# Patient Record
Sex: FEMALE | Race: WHITE | NOT HISPANIC OR LATINO | URBAN - METROPOLITAN AREA
[De-identification: names, ages, dates, MRNs, and addresses within clinical notes are randomized per-mention and may not be internally consistent; named-entity substitution may affect disease eponyms.]

---

## 2022-06-16 ENCOUNTER — OUT OF OFFICE VISIT (OUTPATIENT)
Dept: URBAN - METROPOLITAN AREA MEDICAL CENTER 34 | Facility: MEDICAL CENTER | Age: 57
End: 2022-06-16
Payer: SELF-PAY

## 2022-06-16 DIAGNOSIS — K76.9 CHRONIC LIVER DISEASE: ICD-10-CM

## 2022-06-16 DIAGNOSIS — D64.89 ANEMIA DUE TO OTHER CAUSE: ICD-10-CM

## 2022-06-16 DIAGNOSIS — K76.6 CLINICALLY SIGNIFICANT PORTAL HYPERTENSION: ICD-10-CM

## 2022-06-16 PROCEDURE — 99254 IP/OBS CNSLTJ NEW/EST MOD 60: CPT | Performed by: INTERNAL MEDICINE

## 2022-06-16 PROCEDURE — 99232 SBSQ HOSP IP/OBS MODERATE 35: CPT | Performed by: INTERNAL MEDICINE

## 2022-06-18 ENCOUNTER — OUT OF OFFICE VISIT (OUTPATIENT)
Dept: URBAN - METROPOLITAN AREA MEDICAL CENTER 34 | Facility: MEDICAL CENTER | Age: 57
End: 2022-06-18
Payer: SELF-PAY

## 2022-06-18 DIAGNOSIS — D68.8 AFIBRINOGENEMIA: ICD-10-CM

## 2022-06-18 DIAGNOSIS — D50.8 ACQUIRED IRON DEFICIENCY ANEMIA DUE TO DECREASED ABSORPTION: ICD-10-CM

## 2022-06-18 DIAGNOSIS — K74.69 CIRRHOSIS, CRYPTOGENIC: ICD-10-CM

## 2022-06-18 DIAGNOSIS — R18.8 ABDOMINAL ASCITES: ICD-10-CM

## 2022-06-18 PROCEDURE — 99232 SBSQ HOSP IP/OBS MODERATE 35: CPT | Performed by: INTERNAL MEDICINE

## 2022-06-18 PROCEDURE — 99233 SBSQ HOSP IP/OBS HIGH 50: CPT | Performed by: INTERNAL MEDICINE

## 2022-06-20 ENCOUNTER — OUT OF OFFICE VISIT (OUTPATIENT)
Dept: URBAN - METROPOLITAN AREA MEDICAL CENTER 34 | Facility: MEDICAL CENTER | Age: 57
End: 2022-06-20
Payer: SELF-PAY

## 2022-06-20 DIAGNOSIS — K74.69 CIRRHOSIS, CRYPTOGENIC: ICD-10-CM

## 2022-06-20 DIAGNOSIS — D68.8 AFIBRINOGENEMIA: ICD-10-CM

## 2022-06-20 DIAGNOSIS — D50.8 ACQUIRED IRON DEFICIENCY ANEMIA DUE TO DECREASED ABSORPTION: ICD-10-CM

## 2022-06-20 DIAGNOSIS — R18.8 ABDOMINAL ASCITES: ICD-10-CM

## 2022-06-20 PROCEDURE — 99232 SBSQ HOSP IP/OBS MODERATE 35: CPT | Performed by: INTERNAL MEDICINE

## 2022-07-05 ENCOUNTER — TELEPHONE ENCOUNTER (OUTPATIENT)
Dept: URBAN - METROPOLITAN AREA CLINIC 94 | Facility: CLINIC | Age: 57
End: 2022-07-05

## 2022-07-10 PROBLEM — 328383001 CHRONIC LIVER DISEASE: Status: ACTIVE | Noted: 2022-07-10

## 2022-07-20 ENCOUNTER — WEB ENCOUNTER (OUTPATIENT)
Dept: URBAN - METROPOLITAN AREA CLINIC 94 | Facility: CLINIC | Age: 57
End: 2022-07-20

## 2022-07-20 ENCOUNTER — LAB OUTSIDE AN ENCOUNTER (OUTPATIENT)
Dept: URBAN - METROPOLITAN AREA CLINIC 94 | Facility: CLINIC | Age: 57
End: 2022-07-20

## 2022-07-20 ENCOUNTER — OFFICE VISIT (OUTPATIENT)
Dept: URBAN - METROPOLITAN AREA CLINIC 94 | Facility: CLINIC | Age: 57
End: 2022-07-20
Payer: SELF-PAY

## 2022-07-20 VITALS
WEIGHT: 138 LBS | SYSTOLIC BLOOD PRESSURE: 136 MMHG | DIASTOLIC BLOOD PRESSURE: 55 MMHG | HEIGHT: 62 IN | BODY MASS INDEX: 25.4 KG/M2 | TEMPERATURE: 98.1 F | HEART RATE: 119 BPM

## 2022-07-20 DIAGNOSIS — D50.8 ACQUIRED IRON DEFICIENCY ANEMIA DUE TO DECREASED ABSORPTION: ICD-10-CM

## 2022-07-20 DIAGNOSIS — K70.31 ALCOHOLIC CIRRHOSIS OF LIVER WITH ASCITES: ICD-10-CM

## 2022-07-20 DIAGNOSIS — K72.90 HEPATIC ENCEPHALOPATHY: ICD-10-CM

## 2022-07-20 PROCEDURE — 99214 OFFICE O/P EST MOD 30 MIN: CPT | Performed by: INTERNAL MEDICINE

## 2022-07-20 RX ORDER — FUROSEMIDE 40 MG/1
1 TABLET TABLET ORAL ONCE A DAY
Status: ACTIVE | COMMUNITY

## 2022-07-20 RX ORDER — PANTOPRAZOLE SODIUM 40 MG/1
1 TABLET TABLET, DELAYED RELEASE ORAL ONCE A DAY
Status: ACTIVE | COMMUNITY

## 2022-07-20 RX ORDER — FUROSEMIDE 40 MG/1
1 TABLET TABLET ORAL ONCE A DAY
Qty: 30 | Refills: 3

## 2022-07-20 RX ORDER — RIFAXIMIN 550 MG/1
1 TABLET TABLET ORAL TWICE A DAY
Status: ACTIVE | COMMUNITY

## 2022-07-20 RX ORDER — PREDNISONE 10 MG/1
1 TABLET TABLET ORAL ONCE A DAY
Status: ACTIVE | COMMUNITY

## 2022-07-20 NOTE — HPI-TODAY'S VISIT:
55 y/o F with alcoholic cirrhosis c/b ascites/HE, GERARDO here for f/u after hospitalization  Patient admitted june for severe GERARDO with Hgb 3.9, iron sat 11, Ferritin 10. Also with elevated LFT's with T.bili ~7 and noted to have cirrhosis on imaging. Workup with negative acute viral hepatitis, MAXINE, ASMA, AMA, ceruloplasmin.  She was treated with diuretics, rifaximin(lactulose caused hypokalemia). Could not get EGD given INR, K. Discharge Hgb 8, PLT 76, INR 2.5, T.bili 6 with rest borderline. Discharge medication lasix 40mg  Today reports doing well with no confusion. Has LE on exam today but reports did not take lasix today, and normally is dry   Ultrasound abd 06/2022: liver steatosis, small ascites, right pleural effusion CT a/p 06/2022: anasarca, chronic liver disease and portal HTN

## 2022-08-12 ENCOUNTER — OFFICE VISIT (OUTPATIENT)
Dept: URBAN - METROPOLITAN AREA CLINIC 94 | Facility: CLINIC | Age: 57
End: 2022-08-12

## 2022-08-31 ENCOUNTER — TELEPHONE ENCOUNTER (OUTPATIENT)
Dept: URBAN - METROPOLITAN AREA CLINIC 94 | Facility: CLINIC | Age: 57
End: 2022-08-31

## 2022-08-31 ENCOUNTER — OFFICE VISIT (OUTPATIENT)
Dept: URBAN - METROPOLITAN AREA CLINIC 94 | Facility: CLINIC | Age: 57
End: 2022-08-31

## 2022-08-31 ENCOUNTER — WEB ENCOUNTER (OUTPATIENT)
Dept: URBAN - METROPOLITAN AREA CLINIC 94 | Facility: CLINIC | Age: 57
End: 2022-08-31

## 2022-08-31 VITALS
DIASTOLIC BLOOD PRESSURE: 67 MMHG | TEMPERATURE: 98.4 F | HEART RATE: 93 BPM | WEIGHT: 148 LBS | HEIGHT: 62 IN | SYSTOLIC BLOOD PRESSURE: 124 MMHG | BODY MASS INDEX: 27.23 KG/M2

## 2022-08-31 RX ORDER — PREDNISONE 10 MG/1
1 TABLET TABLET ORAL ONCE A DAY
Status: ACTIVE | COMMUNITY

## 2022-08-31 RX ORDER — PANTOPRAZOLE SODIUM 40 MG/1
1 TABLET TABLET, DELAYED RELEASE ORAL ONCE A DAY
Status: ACTIVE | COMMUNITY

## 2022-08-31 RX ORDER — RIFAXIMIN 550 MG/1
1 TABLET TABLET ORAL TWICE A DAY
Status: ACTIVE | COMMUNITY

## 2022-08-31 RX ORDER — FUROSEMIDE 40 MG/1
1 TABLET TABLET ORAL ONCE A DAY
Qty: 30 | Refills: 3 | Status: ACTIVE | COMMUNITY

## 2022-09-01 ENCOUNTER — OUT OF OFFICE VISIT (OUTPATIENT)
Dept: URBAN - METROPOLITAN AREA MEDICAL CENTER 34 | Facility: MEDICAL CENTER | Age: 57
End: 2022-09-01
Payer: SELF-PAY

## 2022-09-01 ENCOUNTER — TELEPHONE ENCOUNTER (OUTPATIENT)
Dept: URBAN - METROPOLITAN AREA CLINIC 94 | Facility: CLINIC | Age: 57
End: 2022-09-01

## 2022-09-01 DIAGNOSIS — D50.8 ACQUIRED IRON DEFICIENCY ANEMIA DUE TO DECREASED ABSORPTION: ICD-10-CM

## 2022-09-01 DIAGNOSIS — K70.30 ALC (ALCOHOLIC LIVER CIRRHOSIS): ICD-10-CM

## 2022-09-01 LAB
A/G RATIO: 1.1
ABSOLUTE BASOPHILS: 9
ABSOLUTE EOSINOPHILS: 99
ABSOLUTE LYMPHOCYTES: 810
ABSOLUTE MONOCYTES: 237
ABSOLUTE NEUTROPHILS: 1845
ALBUMIN: 2.9
ALKALINE PHOSPHATASE: 131
ALT (SGPT): 10
AST (SGOT): 39
BASOPHILS: 0.3
BILIRUBIN, TOTAL: 5.5
BUN/CREATININE RATIO: (no result)
BUN: 11
CALCIUM: 8.2
CARBON DIOXIDE, TOTAL: 27
CHLORIDE: 106
CREATININE: 0.53
EGFR: 108
EOSINOPHILS: 3.3
GLOBULIN, TOTAL: 2.6
GLUCOSE: 105
HEMATOCRIT: 17.4
HEMOGLOBIN: 6
INR: 1.3
LYMPHOCYTES: 27
MCH: 32.4
MCHC: 34.5
MCV: 94.1
MONOCYTES: 7.9
MPV: 10
NEUTROPHILS: 61.5
PLATELET COUNT: 98
POTASSIUM: 3.6
PROTEIN, TOTAL: 5.5
PT: 13.5
RDW: 13.3
RED BLOOD CELL COUNT: 1.85
SODIUM: 138
WHITE BLOOD CELL COUNT: 3

## 2022-09-01 PROCEDURE — 99254 IP/OBS CNSLTJ NEW/EST MOD 60: CPT | Performed by: INTERNAL MEDICINE

## 2022-09-01 PROCEDURE — 99232 SBSQ HOSP IP/OBS MODERATE 35: CPT | Performed by: INTERNAL MEDICINE

## 2022-09-03 ENCOUNTER — OUT OF OFFICE VISIT (OUTPATIENT)
Dept: URBAN - METROPOLITAN AREA MEDICAL CENTER 34 | Facility: MEDICAL CENTER | Age: 57
End: 2022-09-03

## 2022-09-03 ENCOUNTER — CLAIMS CREATED FROM THE CLAIM WINDOW (OUTPATIENT)
Dept: URBAN - METROPOLITAN AREA MEDICAL CENTER 34 | Facility: MEDICAL CENTER | Age: 57
End: 2022-09-03
Payer: SELF-PAY

## 2022-09-03 DIAGNOSIS — B37.81 CANDIDA: ICD-10-CM

## 2022-09-03 DIAGNOSIS — K31.89 ACQUIRED DEFORMITY OF DUODENUM: ICD-10-CM

## 2022-09-03 DIAGNOSIS — K70.31 ALCOHOLIC CIRRHOSIS OF LIVER WITH ASCITES: ICD-10-CM

## 2022-09-03 DIAGNOSIS — K76.6 CLINICALLY SIGNIFICANT PORTAL HYPERTENSION: ICD-10-CM

## 2022-09-03 DIAGNOSIS — D50.8 ACQUIRED IRON DEFICIENCY ANEMIA DUE TO DECREASED ABSORPTION: ICD-10-CM

## 2022-09-03 PROCEDURE — 45378 DIAGNOSTIC COLONOSCOPY: CPT | Performed by: INTERNAL MEDICINE

## 2022-09-03 PROCEDURE — 99232 SBSQ HOSP IP/OBS MODERATE 35: CPT | Performed by: INTERNAL MEDICINE

## 2022-09-03 PROCEDURE — 43239 EGD BIOPSY SINGLE/MULTIPLE: CPT | Performed by: INTERNAL MEDICINE

## 2022-09-23 ENCOUNTER — TELEPHONE ENCOUNTER (OUTPATIENT)
Dept: URBAN - METROPOLITAN AREA CLINIC 94 | Facility: CLINIC | Age: 57
End: 2022-09-23

## 2022-09-23 RX ORDER — PANTOPRAZOLE SODIUM 40 MG/1
1 TABLET TABLET, DELAYED RELEASE ORAL ONCE A DAY
Status: ACTIVE | COMMUNITY

## 2022-09-23 RX ORDER — FLUCONAZOLE 100 MG/1
1 TABLET TABLET ORAL DAILY
Qty: 14 TABLET | Refills: 0 | OUTPATIENT
Start: 2022-09-23 | End: 2022-10-07

## 2022-09-23 RX ORDER — RIFAXIMIN 550 MG/1
1 TABLET TABLET ORAL TWICE A DAY
Status: ACTIVE | COMMUNITY

## 2022-09-23 RX ORDER — PREDNISONE 10 MG/1
1 TABLET TABLET ORAL ONCE A DAY
Status: ACTIVE | COMMUNITY

## 2022-09-23 RX ORDER — FUROSEMIDE 40 MG/1
1 TABLET TABLET ORAL ONCE A DAY
Qty: 30 | Refills: 3 | Status: ACTIVE | COMMUNITY

## 2022-10-10 ENCOUNTER — OFFICE VISIT (OUTPATIENT)
Dept: URBAN - METROPOLITAN AREA SURGERY CENTER 17 | Facility: SURGERY CENTER | Age: 57
End: 2022-10-10

## 2022-11-01 ENCOUNTER — OFFICE VISIT (OUTPATIENT)
Dept: URBAN - METROPOLITAN AREA CLINIC 94 | Facility: CLINIC | Age: 57
End: 2022-11-01

## 2022-11-28 ENCOUNTER — ERX REFILL RESPONSE (OUTPATIENT)
Dept: URBAN - METROPOLITAN AREA CLINIC 94 | Facility: CLINIC | Age: 57
End: 2022-11-28

## 2022-11-28 RX ORDER — FUROSEMIDE 40 MG/1
TAKE 1 TABLET BY MOUTH EVERY DAY FOR 30 DAYS TABLET ORAL
Qty: 30 TABLET | Refills: 3 | OUTPATIENT

## 2022-11-28 RX ORDER — FUROSEMIDE 40 MG/1
1 TABLET TABLET ORAL ONCE A DAY
Qty: 30 | Refills: 3 | OUTPATIENT

## 2022-11-30 ENCOUNTER — WEB ENCOUNTER (OUTPATIENT)
Dept: URBAN - METROPOLITAN AREA CLINIC 94 | Facility: CLINIC | Age: 57
End: 2022-11-30

## 2022-11-30 ENCOUNTER — DASHBOARD ENCOUNTERS (OUTPATIENT)
Age: 57
End: 2022-11-30

## 2022-11-30 ENCOUNTER — OFFICE VISIT (OUTPATIENT)
Dept: URBAN - METROPOLITAN AREA CLINIC 94 | Facility: CLINIC | Age: 57
End: 2022-11-30
Payer: SELF-PAY

## 2022-11-30 VITALS
BODY MASS INDEX: 27.05 KG/M2 | WEIGHT: 147 LBS | HEIGHT: 62 IN | DIASTOLIC BLOOD PRESSURE: 71 MMHG | SYSTOLIC BLOOD PRESSURE: 139 MMHG | HEART RATE: 101 BPM | TEMPERATURE: 97.7 F

## 2022-11-30 DIAGNOSIS — K70.31 ALCOHOLIC CIRRHOSIS OF LIVER WITH ASCITES: ICD-10-CM

## 2022-11-30 DIAGNOSIS — K72.90 HEPATIC ENCEPHALOPATHY: ICD-10-CM

## 2022-11-30 DIAGNOSIS — D50.9 IRON DEFICIENCY ANEMIA, UNSPECIFIED IRON DEFICIENCY ANEMIA TYPE: ICD-10-CM

## 2022-11-30 PROBLEM — 87522002: Status: ACTIVE | Noted: 2022-07-20

## 2022-11-30 PROBLEM — 420054005: Status: ACTIVE | Noted: 2022-07-20

## 2022-11-30 PROCEDURE — 99214 OFFICE O/P EST MOD 30 MIN: CPT | Performed by: INTERNAL MEDICINE

## 2022-11-30 RX ORDER — SPIRONOLACTONE 25 MG/1
1 TABLET TABLET, FILM COATED ORAL ONCE A DAY
Qty: 30 TABLET | Refills: 3 | OUTPATIENT
Start: 2022-11-30 | End: 2023-03-30

## 2022-11-30 RX ORDER — PANTOPRAZOLE SODIUM 40 MG/1
1 TABLET TABLET, DELAYED RELEASE ORAL ONCE A DAY
Status: ACTIVE | COMMUNITY

## 2022-11-30 RX ORDER — PREDNISONE 10 MG/1
1 TABLET TABLET ORAL ONCE A DAY
Status: ACTIVE | COMMUNITY

## 2022-11-30 RX ORDER — FUROSEMIDE 40 MG/1
TAKE 1 TABLET BY MOUTH EVERY DAY FOR 30 DAYS TABLET ORAL
Qty: 30 TABLET | Refills: 3 | Status: ACTIVE | COMMUNITY

## 2022-11-30 RX ORDER — RIFAXIMIN 550 MG/1
1 TABLET TABLET ORAL TWICE A DAY
Status: ACTIVE | COMMUNITY

## 2022-11-30 NOTE — HPI-TODAY'S VISIT:
58 y/o F with alcoholic cirrhosis c/b ascites/HE, GERARDO here for f/u after hospitalization  Patient admitted june for severe GERARDO with Hgb 3.9, iron sat 11, Ferritin 10. Also with elevated LFT's with T.bili ~7 and noted to have cirrhosis on imaging. Workup with negative acute viral hepatitis, MAXINE, ASMA, AMA, ceruloplasmin.  Treated with lasix, rifaximin(lactulose caused hypokalemia). Seen in lin for f/u where Hgb 6 so  readmitted to [facility] this September now s/p EGD/CLS with esophageal candidiasis now s/p fluconazole. Discharge Hgb 7.7, T.bili 7  Today reports doing well. Does complain of LE edema, is complaint with lasix.   EGD 09/2022:  A few whitish plaques, mild PHG. No varices. Biopsies with candida  CLS 09/2022: Medium IH, otherwise normal.  Ultrasound abd 06/2022: liver steatosis, small ascites, right pleural effusion CT a/p 06/2022: anasarca, chronic liver disease and portal HTN

## 2022-12-01 ENCOUNTER — TELEPHONE ENCOUNTER (OUTPATIENT)
Dept: URBAN - METROPOLITAN AREA CLINIC 94 | Facility: CLINIC | Age: 57
End: 2022-12-01

## 2022-12-01 LAB
A/G RATIO: 1.4
ALBUMIN: 3.3
ALKALINE PHOSPHATASE: 183
ALT (SGPT): 11
AST (SGOT): 39
BILIRUBIN, TOTAL: 4.9
BUN/CREATININE RATIO: (no result)
BUN: 12
CALCIUM: 8.8
CARBON DIOXIDE, TOTAL: 26
CHLORIDE: 105
CREATININE: 0.55
EGFR: 108
GLOBULIN, TOTAL: 2.4
GLUCOSE: 94
HEMATOCRIT: 22.2
HEMOGLOBIN: 7.5
MCH: 32.9
MCHC: 33.8
MCV: 97.4
MPV: 10
PLATELET COUNT: 76
POTASSIUM: 4.2
PROTEIN, TOTAL: 5.7
RDW: 15.1
RED BLOOD CELL COUNT: 2.28
SODIUM: 137
WHITE BLOOD CELL COUNT: 3.1

## 2022-12-19 ENCOUNTER — OFFICE VISIT (OUTPATIENT)
Dept: URBAN - METROPOLITAN AREA CLINIC 92 | Facility: CLINIC | Age: 57
End: 2022-12-19

## 2022-12-28 ENCOUNTER — ERX REFILL RESPONSE (OUTPATIENT)
Dept: URBAN - METROPOLITAN AREA CLINIC 94 | Facility: CLINIC | Age: 57
End: 2022-12-28

## 2022-12-28 RX ORDER — SPIRONOLACTONE 25 MG/1
TAKE 1 TABLET BY MOUTH EVERY DAY FOR 30 DAYS TABLET ORAL
Qty: 30 TABLET | Refills: 3 | OUTPATIENT

## 2022-12-28 RX ORDER — SPIRONOLACTONE 25 MG/1
1 TABLET TABLET, FILM COATED ORAL ONCE A DAY
Qty: 30 TABLET | Refills: 3 | OUTPATIENT

## 2022-12-29 ENCOUNTER — OFFICE VISIT (OUTPATIENT)
Dept: URBAN - METROPOLITAN AREA CLINIC 93 | Facility: CLINIC | Age: 57
End: 2022-12-29

## 2023-03-28 ENCOUNTER — OFFICE VISIT (OUTPATIENT)
Dept: URBAN - METROPOLITAN AREA CLINIC 94 | Facility: CLINIC | Age: 58
End: 2023-03-28